# Patient Record
(demographics unavailable — no encounter records)

---

## 2018-03-17 NOTE — EDM.PDOC
ED HPI GENERAL MEDICAL PROBLEM





- General


Chief Complaint: Eye Problems


Stated Complaint: eye pain


Time Seen by Provider: 03/17/18 12:10


Source of Information: Reports: Patient


History Limitations: Reports: No Limitations





- History of Present Illness


INITIAL COMMENTS - FREE TEXT/NARRATIVE: 





Patient is a 70-year-old who was brought in by family with chief complaint of  

left eye pain state pain is about 8 out of 10 a patient states that this has 

been going on for about 2 weeks but not as severe and after a couple hours with 

a contact lens it disappears but today it intensify he remove the lens and the 

pain was 8 out of 10 he came in for evaluation evaluation revealed laceration 

of cornea.


Onset: Gradual


Duration: Week(s):, Getting Worse


Location: Reports: Face (Left eye)


Quality: Reports: Stabbing


Severity: Severe


Improves with: Reports: None


Worsens with: Reports: None


Context: Reports: Other (Corneal laceration)


Associated Symptoms: Reports: No Other Symptoms


Treatments PTA: Reports: Acetaminophen





- Related Data


 Allergies











Allergy/AdvReac Type Severity Reaction Status Date / Time


 


No Known Allergies Allergy   Verified 03/17/18 12:03











Home Meds: 


 Home Meds





Aspirin 325 mg PO DAILY 03/17/18 [History]


Meloxicam 15 mg PO DAILY 03/17/18 [History]


Pramipexole [Mirapex] 1 mg PO BID 03/17/18 [History]


Ranitidine HCl [Ranitidine] 150 mg PO BID 03/17/18 [History]


Rosuvastatin [Crestor] 20 mg PO DAILY 03/17/18 [History]











ED ROS GENERAL





- Review of Systems


Review Of Systems: See Below


Constitutional: Reports: No Symptoms


HEENT: Reports: Contact Lenses, Eye Pain


Respiratory: Reports: No Symptoms


Cardiovascular: Reports: No Symptoms


Endocrine: Reports: No Symptoms


GI/Abdominal: Reports: No Symptoms


: Reports: No Symptoms


Musculoskeletal: Reports: No Symptoms


Skin: Reports: No Symptoms


Neurological: Reports: No Symptoms





ED EXAM GENERAL W FULL EYE





- Physical Exam


Exam: See Below


Exam Limited By: No Limitations


General Appearance: Alert, WD/WN, Moderate Distress


Eye Exam: Left Eye: Corneal Abrasion (At 9:00), Bilateral Eye: Abnormal EOM, 

Abnormal Pupil, Normal Inspection


Eyelids: Bilateral: Normal Appearance


Conjunctiva & Sclera: Right: Conjunctival Edema, Discharge


Cornea Exam: Right: Examined with Flourescein (Corneal abrasion), Left: Corneal 

Abrasion


Extraocular Movements: Bilateral: Intact


Pupils: Normal Accommodation


Pupillary Size: Bilateral: 3 mm


Pupillary Reaction: Bilateral: Brisk


Ears: Normal External Exam, Normal Canal, Hearing Grossly Normal, Normal TMs


Nose: Normal Inspection, Normal Mucosa, No Blood


Throat/Mouth: Normal Inspection, Normal Lips, Normal Teeth, Normal Gums, Normal 

Oropharynx, Normal Voice, No Airway Compromise


Head: Atraumatic, Normocephalic


Neck: Normal Inspection, Supple, Non-Tender, Full Range of Motion


Respiratory/Chest: No Respiratory Distress, Lungs Clear, Normal Breath Sounds, 

No Accessory Muscle Use, Chest Non-Tender


Cardiovascular: Normal Peripheral Pulses, Regular Rate, Rhythm, No Edema, No 

Gallop, No JVD, No Murmur, No Rub


GI/Abdominal: Normal Bowel Sounds, Soft, Non-Tender, No Organomegaly, No 

Distention, No Abnormal Bruit, No Mass


 (Male) Exam: Deferred


Rectal (Males) Exam: Deferred


Back Exam: Normal Inspection, Full Range of Motion, NT


Extremities: Normal Inspection, Normal Range of Motion, Non-Tender, Normal 

Capillary Refill, No Pedal Edema


Neurological: Alert, Oriented, CN II-XII Intact, Normal Cognition, Normal Gait, 

Normal Reflexes, No Motor/Sensory Deficits


Psychiatric: Normal Affect, Normal Mood


Skin Exam: Warm, Dry, Intact, Normal Color, No Rash





Course





- Vital Signs


Last Recorded V/S: 





 Last Vital Signs











Temp  98.6 F   03/17/18 11:54


 


Pulse  66   03/17/18 11:54


 


Resp  16   03/17/18 11:54


 


BP  152/79 H  03/17/18 11:54


 


Pulse Ox  95   03/17/18 11:54














- Orders/Labs/Meds


Orders: 





 Active Orders 24 hr











 Category Date Time Status


 


 Acetaminophen/HYDROcodone [Norco 325-5 MG] Med  03/17/18 12:31 Once





 2 tab PO ONETIME ONE   


 


 Gentamicin [Gentak 0.3% Ophth Oint] Med  03/17/18 18:00 Active





 1 gm EYELF TID   








 Medication Orders





Gentamicin Sulfate (Gentak 0.3% Ophth Oint)  1 gm EYELF TID [facility]








Meds: 





Medications











Generic Name Dose Route Start Last Admin





  Trade Name Juanq  PRN Reason Stop Dose Admin


 


Gentamicin Sulfate  1 gm  03/17/18 18:00  





  Gentak 0.3% Ophth Oint  EYELF   





  TID FELICIANO   














Discontinued Medications














Generic Name Dose Route Start Last Admin





  Trade Name Chana  PRN Reason Stop Dose Admin


 


Balanced Salt Solution  Confirm  03/17/18 12:15  03/17/18 12:28





  Eye Stream Eye Rinse  Administered  03/17/18 12:16  15 ml





  Dose   Administration





  30 ml   





  .ROUTE   





  .STK-MED ONE   


 


Tetracaine HCl  Confirm  03/17/18 12:15  03/17/18 12:27





  Tetracaine 0.5% Steri-Unit Sol  Administered  03/17/18 12:16  2 drop





  Dose   Administration





  4 ml   





  .ROUTE   





  .STK-MED ONE   














Departure





- Departure


Time of Disposition: 12:44


Disposition: Home, Self-Care 01


Condition: Fair


Clinical Impression: 


 Corneal abrasion





Corneal abrasion due to contact lens


Qualifiers:


 Laterality: left Qualified Code(s): H18.822 - Corneal disorder due to contact 

lens, left eye








- Discharge Information


Instructions:  Corneal Abrasion, Easy-to-Read


Referrals: 


PCP,Unknown [Primary Care Provider] - 


Care Plan Goals: 


Patient will be sent home on an eye patch with gentamicin ointment to apply 

every 4 hours every 6 hours also will send him home with tramadol for pain 

relief





- My Orders


Last 24 Hours: 





My Active Orders





03/17/18 12:31


Acetaminophen/HYDROcodone [Norco 325-5 MG]   2 tab PO ONETIME ONE 





03/17/18 18:00


Gentamicin [Gentak 0.3% Ophth Oint]   1 gm EYELF TID 














- Assessment/Plan


Last 24 Hours: 





My Active Orders





03/17/18 12:31


Acetaminophen/HYDROcodone [Norco 325-5 MG]   2 tab PO ONETIME ONE 





03/17/18 18:00


Gentamicin [Gentak 0.3% Ophth Oint]   1 gm EYELF TID